# Patient Record
Sex: MALE | Race: WHITE | NOT HISPANIC OR LATINO | URBAN - METROPOLITAN AREA
[De-identification: names, ages, dates, MRNs, and addresses within clinical notes are randomized per-mention and may not be internally consistent; named-entity substitution may affect disease eponyms.]

---

## 2017-11-09 ENCOUNTER — OFFICE VISIT - RIVER FALLS (OUTPATIENT)
Dept: FAMILY MEDICINE | Facility: CLINIC | Age: 42
End: 2017-11-09

## 2017-11-09 ASSESSMENT — MIFFLIN-ST. JEOR: SCORE: 1573.46

## 2022-02-11 VITALS
SYSTOLIC BLOOD PRESSURE: 110 MMHG | OXYGEN SATURATION: 99 % | HEIGHT: 72 IN | HEART RATE: 61 BPM | WEIGHT: 143.4 LBS | BODY MASS INDEX: 19.42 KG/M2 | DIASTOLIC BLOOD PRESSURE: 74 MMHG | TEMPERATURE: 97.7 F

## 2022-02-16 NOTE — PROGRESS NOTES
Patient:   CARMEN JEFFRIES            MRN: [id number]            FIN: [id number]               Age:   41 years     Sex:  Male     :  1975   Associated Diagnoses:   Otitis externa   Author:   Percy Aguiar MD      Chief Complaint   2017 8:12 AM CST    pt here to discuss ears, would like ear drops refilled, says he has holes in his ears, is unable to do the surgery due to insurance purposes, has loss of hearing      History of Present Illness   see chief complaint as noted above and confirmed with the patient     41 year old male here today to discuss his ears. He would like some drop's that he recieved from the [facility] a few years ago. He say's he has been told he has holes in his ear's and when he blows he the air comes through his ears. He say's he get's ear infection's sometimes and the drops help relieve his pain. He states there could be a surgery done but he is unable to pay for it at this time. He uses the drop's a few times a year, he say's the bottle last's him about a year and a half. This time of the year is tough due to the cold air and getting cold's and such. He also mention's hearing loss that has occured over the years.       Review of Systems   Constitutional:  No fever.    Ear/Nose/Mouth/Throat:       Ear pain: Bilateral.    Respiratory:  No cough.    Gastrointestinal:  No nausea, No vomiting, No diarrhea.    Integumentary:  No rash.    Neurologic:  Alert and oriented X4.             Health Status   Allergies:    Allergic Reactions (Selected)  Anaphylaxis  Penicillin (No reactions were documented)  Sulfa drug (No reactions were documented)   Medications:  (Selected)   Documented Medications  Documented  Tylenol: po, Instructions: as needed, 0 Refill(s), Type: Maintenance   Problem list:    No problem items selected or recorded.      Histories   Past Medical History:    No active or resolved past medical history items have been selected or recorded.   Family History:    Sofie  disease  Mother     Procedure history:    No active procedure history items have been selected or recorded.   Social History:        Alcohol Assessment            Current, 1-2 times per week, 1 drinks/episode average.  2 drinks/episode maximum.      Tobacco Assessment            Never smoker      Substance Abuse Assessment            Past, Marijuana      Employment and Education Assessment            Employed, Work/School description: Retail.      Nutrition and Health Assessment            Type of diet: Regular.      Exercise and Physical Activity Assessment            Exercise frequency: 1-2 times/week.  Exercise type: Back Stretches.      Sexual Assessment            Sexually active: Yes.  Sexual orientation: Heterosexual.  Contraceptive Use Details: Condoms.        Physical Examination   Vital Signs   11/9/2017 8:12 AM CST Temperature Tympanic 97.7 DegF  LOW    Peripheral Pulse Rate 61 bpm    Pulse Site Radial artery    Systolic Blood Pressure 110 mmHg    Diastolic Blood Pressure 74 mmHg    Mean Arterial Pressure 86 mmHg    BP Site Right arm    Oxygen Saturation 99 %      Measurements from flowsheet : Measurements   11/9/2017 8:12 AM CST Height Measured - Standard 72 in    Weight Measured - Standard 143.4 lb    BSA 1.82 m2    Body Mass Index 19.45 kg/m2      General:  Alert and oriented, No acute distress.    Eye:  Pupils are equal, round and reactive to light, Normal conjunctiva.    HENT:  Oral mucosa is moist.    Neck:  Supple, No lymphadenopathy.    Respiratory:  Respirations are non-labored.    Musculoskeletal:  Normal gait.    Integumentary:  Warm, No rash.    Psychiatric:  Cooperative, Appropriate mood & affect, Normal judgment.       Review / Management   Results review      Impression and Plan       Diagnosis     Otitis externa ([id number]-HM H60.90).     Course:  i did not see TM perforation or hole  some ear canal reddness.    Plan:  Will send in ear drops with a few refills. Please come back in for a visit  if you find yourself using the drops more often. .    I, Snow Dumont Medical Assistant acted solely as a scribe for, and in presence of Dr. Percy Aguiar who performed the services.

## 2025-04-15 ENCOUNTER — HOSPITAL ENCOUNTER (EMERGENCY)
Facility: HOSPITAL | Age: 50
Discharge: HOME | End: 2025-04-15
Attending: EMERGENCY MEDICINE | Admitting: EMERGENCY MEDICINE
Payer: COMMERCIAL

## 2025-04-15 ENCOUNTER — APPOINTMENT (OUTPATIENT)
Dept: CT IMAGING | Facility: HOSPITAL | Age: 50
End: 2025-04-15
Payer: COMMERCIAL

## 2025-04-15 ENCOUNTER — APPOINTMENT (OUTPATIENT)
Dept: RADIOLOGY | Facility: HOSPITAL | Age: 50
End: 2025-04-15
Payer: COMMERCIAL

## 2025-04-15 VITALS
SYSTOLIC BLOOD PRESSURE: 131 MMHG | HEIGHT: 60 IN | WEIGHT: 155 LBS | TEMPERATURE: 98.7 F | DIASTOLIC BLOOD PRESSURE: 77 MMHG | HEART RATE: 58 BPM | OXYGEN SATURATION: 100 % | RESPIRATION RATE: 13 BRPM | BODY MASS INDEX: 30.43 KG/M2

## 2025-04-15 DIAGNOSIS — R55 SYNCOPE, UNSPECIFIED SYNCOPE TYPE: ICD-10-CM

## 2025-04-15 DIAGNOSIS — F32.A DEPRESSION, UNSPECIFIED DEPRESSION TYPE: ICD-10-CM

## 2025-04-15 PROBLEM — N20.0 KIDNEY STONE: Status: ACTIVE | Noted: 2025-04-15

## 2025-04-15 LAB
ALBUMIN SERPL BCG-MCNC: 4.9 G/DL (ref 3.5–5.2)
ALBUMIN UR-MCNC: NEGATIVE MG/DL
ALP SERPL-CCNC: 80 U/L (ref 40–150)
ALT SERPL W P-5'-P-CCNC: 15 U/L (ref 0–70)
ANION GAP SERPL CALCULATED.3IONS-SCNC: 11 MMOL/L (ref 7–15)
APPEARANCE UR: CLEAR
AST SERPL W P-5'-P-CCNC: 25 U/L (ref 0–45)
BASOPHILS # BLD AUTO: 0 10E3/UL (ref 0–0.2)
BASOPHILS NFR BLD AUTO: 0 %
BILIRUB SERPL-MCNC: 0.6 MG/DL
BILIRUB UR QL STRIP: NEGATIVE
BUN SERPL-MCNC: 18.3 MG/DL (ref 6–20)
CALCIUM SERPL-MCNC: 9.4 MG/DL (ref 8.8–10.4)
CHLORIDE SERPL-SCNC: 102 MMOL/L (ref 98–107)
COLOR UR AUTO: NORMAL
CREAT SERPL-MCNC: 0.92 MG/DL (ref 0.67–1.17)
D DIMER PPP FEU-MCNC: <0.27 UG/ML FEU (ref 0–0.5)
EGFRCR SERPLBLD CKD-EPI 2021: >90 ML/MIN/1.73M2
EOSINOPHIL # BLD AUTO: 0.1 10E3/UL (ref 0–0.7)
EOSINOPHIL NFR BLD AUTO: 1 %
ERYTHROCYTE [DISTWIDTH] IN BLOOD BY AUTOMATED COUNT: 13.2 % (ref 10–15)
GLUCOSE SERPL-MCNC: 124 MG/DL (ref 70–99)
GLUCOSE UR STRIP-MCNC: NEGATIVE MG/DL
HCO3 SERPL-SCNC: 29 MMOL/L (ref 22–29)
HCT VFR BLD AUTO: 47.8 % (ref 40–53)
HGB BLD-MCNC: 15.8 G/DL (ref 13.3–17.7)
HGB UR QL STRIP: NEGATIVE
IMM GRANULOCYTES # BLD: 0 10E3/UL
IMM GRANULOCYTES NFR BLD: 0 %
KETONES UR STRIP-MCNC: NEGATIVE MG/DL
LEUKOCYTE ESTERASE UR QL STRIP: NEGATIVE
LIPASE SERPL-CCNC: 161 U/L (ref 13–60)
LYMPHOCYTES # BLD AUTO: 1.3 10E3/UL (ref 0.8–5.3)
LYMPHOCYTES NFR BLD AUTO: 11 %
MAGNESIUM SERPL-MCNC: 1.8 MG/DL (ref 1.7–2.3)
MCH RBC QN AUTO: 28.9 PG (ref 26.5–33)
MCHC RBC AUTO-ENTMCNC: 33.1 G/DL (ref 31.5–36.5)
MCV RBC AUTO: 87 FL (ref 78–100)
MONOCYTES # BLD AUTO: 0.6 10E3/UL (ref 0–1.3)
MONOCYTES NFR BLD AUTO: 5 %
NEUTROPHILS # BLD AUTO: 10 10E3/UL (ref 1.6–8.3)
NEUTROPHILS NFR BLD AUTO: 83 %
NITRATE UR QL: NEGATIVE
NRBC # BLD AUTO: 0 10E3/UL
NRBC BLD AUTO-RTO: 0 /100
PH UR STRIP: 7 [PH] (ref 5–7)
PLATELET # BLD AUTO: 221 10E3/UL (ref 150–450)
POTASSIUM SERPL-SCNC: 4 MMOL/L (ref 3.4–5.3)
PROT SERPL-MCNC: 7.5 G/DL (ref 6.4–8.3)
RBC # BLD AUTO: 5.47 10E6/UL (ref 4.4–5.9)
RBC URINE: 0 /HPF
SODIUM SERPL-SCNC: 142 MMOL/L (ref 135–145)
SP GR UR STRIP: 1.01 (ref 1–1.03)
TROPONIN T SERPL HS-MCNC: <6 NG/L
UROBILINOGEN UR STRIP-MCNC: NORMAL MG/DL
WBC # BLD AUTO: 12 10E3/UL (ref 4–11)
WBC URINE: <1 /HPF

## 2025-04-15 PROCEDURE — 83735 ASSAY OF MAGNESIUM: CPT

## 2025-04-15 PROCEDURE — 83690 ASSAY OF LIPASE: CPT

## 2025-04-15 PROCEDURE — 85041 AUTOMATED RBC COUNT: CPT

## 2025-04-15 PROCEDURE — 85379 FIBRIN DEGRADATION QUANT: CPT

## 2025-04-15 PROCEDURE — 93005 ELECTROCARDIOGRAM TRACING: CPT | Performed by: EMERGENCY MEDICINE

## 2025-04-15 PROCEDURE — 84484 ASSAY OF TROPONIN QUANT: CPT

## 2025-04-15 PROCEDURE — 71046 X-RAY EXAM CHEST 2 VIEWS: CPT

## 2025-04-15 PROCEDURE — 85004 AUTOMATED DIFF WBC COUNT: CPT

## 2025-04-15 PROCEDURE — 80053 COMPREHEN METABOLIC PANEL: CPT

## 2025-04-15 PROCEDURE — 93005 ELECTROCARDIOGRAM TRACING: CPT | Performed by: STUDENT IN AN ORGANIZED HEALTH CARE EDUCATION/TRAINING PROGRAM

## 2025-04-15 PROCEDURE — 36415 COLL VENOUS BLD VENIPUNCTURE: CPT

## 2025-04-15 PROCEDURE — 70450 CT HEAD/BRAIN W/O DYE: CPT

## 2025-04-15 PROCEDURE — 99285 EMERGENCY DEPT VISIT HI MDM: CPT | Mod: 25 | Performed by: EMERGENCY MEDICINE

## 2025-04-15 PROCEDURE — 81001 URINALYSIS AUTO W/SCOPE: CPT

## 2025-04-15 ASSESSMENT — COLUMBIA-SUICIDE SEVERITY RATING SCALE - C-SSRS
6. HAVE YOU EVER DONE ANYTHING, STARTED TO DO ANYTHING, OR PREPARED TO DO ANYTHING TO END YOUR LIFE?: NO
2. HAVE YOU ACTUALLY HAD ANY THOUGHTS OF KILLING YOURSELF IN THE PAST MONTH?: NO
1. IN THE PAST MONTH, HAVE YOU WISHED YOU WERE DEAD OR WISHED YOU COULD GO TO SLEEP AND NOT WAKE UP?: NO

## 2025-04-15 ASSESSMENT — ACTIVITIES OF DAILY LIVING (ADL)
ADLS_ACUITY_SCORE: 41
ADLS_ACUITY_SCORE: 41

## 2025-04-15 NOTE — Clinical Note
Yogesh Holloway was seen and treated in our emergency department on 4/15/2025.  He may return to work on 04/16/2025.       If you have any questions or concerns, please don't hesitate to call.      Cori Tenorio PA-C
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
## 2025-04-15 NOTE — DISCHARGE INSTRUCTIONS
You are evaluated for syncope here today.  All of your lab work and imaging was reassuring here today.  Placed a primary care referral for you, you should follow-up with them to discuss if any further work up is indicated or any new concerns you may have.     If you develop chest pain, or any new or worsening symptoms, please return to the Emergency Department for evaluation.
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
## 2025-04-16 LAB
ATRIAL RATE - MUSE: 73 BPM
DIASTOLIC BLOOD PRESSURE - MUSE: 101 MMHG
INTERPRETATION ECG - MUSE: NORMAL
P AXIS - MUSE: 63 DEGREES
PR INTERVAL - MUSE: 142 MS
QRS DURATION - MUSE: 74 MS
QT - MUSE: 400 MS
QTC - MUSE: 440 MS
R AXIS - MUSE: 61 DEGREES
SYSTOLIC BLOOD PRESSURE - MUSE: 162 MMHG
T AXIS - MUSE: 64 DEGREES
VENTRICULAR RATE- MUSE: 73 BPM

## 2025-04-18 PROBLEM — F12.10 MARIJUANA ABUSE: Status: ACTIVE | Noted: 2025-04-18

## 2025-04-18 PROBLEM — R55 SYNCOPE: Status: ACTIVE | Noted: 2025-04-18

## 2025-04-18 PROBLEM — F41.0 PANIC ATTACK: Status: ACTIVE | Noted: 2025-04-18

## 2025-04-18 PROBLEM — N20.0 KIDNEY STONE: Status: RESOLVED | Noted: 2025-04-15 | Resolved: 2025-04-18

## 2025-04-18 PROBLEM — F41.9 ANXIETY: Status: ACTIVE | Noted: 2025-04-17

## 2025-04-18 PROBLEM — R10.32 LEFT GROIN PAIN: Status: ACTIVE | Noted: 2025-04-18

## 2025-04-23 ENCOUNTER — PATIENT OUTREACH (OUTPATIENT)
Dept: CARE COORDINATION | Facility: CLINIC | Age: 50
End: 2025-04-23
Payer: COMMERCIAL

## 2025-04-25 ENCOUNTER — OFFICE VISIT (OUTPATIENT)
Dept: SURGERY | Facility: CLINIC | Age: 50
End: 2025-04-25
Payer: COMMERCIAL

## 2025-04-25 ENCOUNTER — HOSPITAL ENCOUNTER (OUTPATIENT)
Dept: ULTRASOUND IMAGING | Facility: HOSPITAL | Age: 50
Discharge: HOME | End: 2025-04-25
Attending: SURGERY | Admitting: SURGERY
Payer: COMMERCIAL

## 2025-04-25 VITALS — BODY MASS INDEX: 20.9 KG/M2 | HEIGHT: 72 IN | WEIGHT: 154.3 LBS

## 2025-04-25 DIAGNOSIS — R10.32 LEFT GROIN PAIN: ICD-10-CM

## 2025-04-25 PROCEDURE — 76705 ECHO EXAM OF ABDOMEN: CPT

## 2025-04-25 PROCEDURE — 99243 OFF/OP CNSLTJ NEW/EST LOW 30: CPT | Performed by: SURGERY

## 2025-04-25 NOTE — LETTER
4/25/2025      Yogesh Holloway  [address] [place] [address] [address] [place] [id number]      Dear Colleague,    Thank you for referring your patient, Yogesh Holloway, to the [facility] [place]. Please see a copy of my visit note below.    HPI:  Yogesh Holloway is a 49 year old male who was referred to me by Marlo Weston for an inguinal hernia. He  presents today with complaints of left groin pain that has been plaguing him now for several years.  He underwent an open left inguinal hernia repair in 2019, and notes that he has been struggling with left groin pain basically ever since that time.  He recalls feeling a pop 2 weeks following that surgery after which he began having more severe pain.    2 years ago, he did undergo evaluation for this with another surgeon.  Part of that workup included CT imaging, as well as what sounds like a nerve injection in the area of the left groin.  He notes that this left the area numb, but did not significantly improve the pain in his left groin.  He recalls being told after that workup that he would simply have to deal with the pain as there was nothing else that could be done.    He continues to struggle with groin pain, which has prompted his referral to us today.      Allergies:Sulfamethoxazole-trimethoprim, Penicillins, and Sulfa antibiotics    No past medical history on file.    Past Surgical History:   Procedure Laterality Date     INGUINAL HERNIA REPAIR Left 2019     MYRINGOTOMY W/ TUBES Bilateral      WISDOM TOOTH EXTRACTION         CURRENT MEDS:  No current outpatient medications on file.       Family History   Problem Relation Age of Onset     Graves' disease Mother      Cervical Cancer Mother      Hypertension Father      Myocardial Infarction Father      Heart Surgery Father         reports that he has never smoked. He has never used smokeless tobacco. He reports current drug use. Frequency: 7.00 times per week. Drug:  Marijuana.    Review of Systems -   The 10 point review of systems  is within normal limits except for as mentioned above in the HPI.  General ROS: No complaints or constitutional symptoms  Skin: No complaints or symptoms   Hematologic/Lymphatic: No symptoms or complaints  Psychiatric: No symptoms or complaints  Endocrine: No excessive fatigue, no hypermetabolic symptoms reported  Respiratory ROS: no cough, shortness of breath, or wheezing  Cardiovascular ROS: no chest pain or dyspnea on exertion  Gastrointestinal ROS: As per HPI  Musculoskeletal ROS: no recent injuries reported  Neurological ROS: no focal neurologic defects reported.        There were no vitals taken for this visit.  There is no height or weight on file to calculate BMI.    EXAM:  General : Alert, cooperative, appears stated age   Skin: Skin color, texture, turgor normal, no rashes or lesions   Lymphatic: No obvious adenopathy, no swelling   Eyes: No scleral icterus, pupils equal  HENT: no traumatic injury to the head or face, no gross abnormalities  Lungs: Normal respiratory effort, breath sounds equal bilaterally  Heart: Regular rate and rhythm  Abdomen: Soft, nondistended and currently nontender.  Has some focal tenderness in the left groin region superior and lateral to where I would expect the internal inguinal ring to reside.  No palpable hernia defect with or without Valsalva  Musculoskeletal: No obvious swelling,  Neurologic: Grossly intact        IMAGES:   Ultrasound imaging obtained at the end of our visit demonstrated no evidence of left sided inguinal hernia recurrence with or without Valsalva.        Assessment/Plan:   1. Left groin pain        Yogesh Holloway is a 49 year old male with left groin pain, but no clear evidence of hernia recurrence.  At this point our neck step would be to repeat an ilioinguinal nerve injection to see if there is any improvement in symptoms.    Adan Moran MD, FACS  Office: [phone]  [facility]  [place]   General and Bariatric Surgery      Again, thank you for allowing me to participate in the care of your patient.        Sincerely,        Adan Moran MD    Electronically signed

## 2025-04-25 NOTE — PROGRESS NOTES
HPI:  Yogesh Holloway is a 49 year old male who was referred to me by Marlo Weston for an inguinal hernia. He  presents today with complaints of left groin pain that has been plaguing him now for several years.  He underwent an open left inguinal hernia repair in 2019, and notes that he has been struggling with left groin pain basically ever since that time.  He recalls feeling a pop 2 weeks following that surgery after which he began having more severe pain.    2 years ago, he did undergo evaluation for this with another surgeon.  Part of that workup included CT imaging, as well as what sounds like a nerve injection in the area of the left groin.  He notes that this left the area numb, but did not significantly improve the pain in his left groin.  He recalls being told after that workup that he would simply have to deal with the pain as there was nothing else that could be done.    He continues to struggle with groin pain, which has prompted his referral to us today.      Allergies:Sulfamethoxazole-trimethoprim, Penicillins, and Sulfa antibiotics    No past medical history on file.    Past Surgical History:   Procedure Laterality Date    INGUINAL HERNIA REPAIR Left 2019    MYRINGOTOMY W/ TUBES Bilateral     WISDOM TOOTH EXTRACTION         CURRENT MEDS:  No current outpatient medications on file.       Family History   Problem Relation Age of Onset    Graves' disease Mother     Cervical Cancer Mother     Hypertension Father     Myocardial Infarction Father     Heart Surgery Father         reports that he has never smoked. He has never used smokeless tobacco. He reports current drug use. Frequency: 7.00 times per week. Drug: Marijuana.    Review of Systems -   The 10 point review of systems  is within normal limits except for as mentioned above in the HPI.  General ROS: No complaints or constitutional symptoms  Skin: No complaints or symptoms   Hematologic/Lymphatic: No symptoms or complaints  Psychiatric: No  symptoms or complaints  Endocrine: No excessive fatigue, no hypermetabolic symptoms reported  Respiratory ROS: no cough, shortness of breath, or wheezing  Cardiovascular ROS: no chest pain or dyspnea on exertion  Gastrointestinal ROS: As per HPI  Musculoskeletal ROS: no recent injuries reported  Neurological ROS: no focal neurologic defects reported.        There were no vitals taken for this visit.  There is no height or weight on file to calculate BMI.    EXAM:  General : Alert, cooperative, appears stated age   Skin: Skin color, texture, turgor normal, no rashes or lesions   Lymphatic: No obvious adenopathy, no swelling   Eyes: No scleral icterus, pupils equal  HENT: no traumatic injury to the head or face, no gross abnormalities  Lungs: Normal respiratory effort, breath sounds equal bilaterally  Heart: Regular rate and rhythm  Abdomen: Soft, nondistended and currently nontender.  Has some focal tenderness in the left groin region superior and lateral to where I would expect the internal inguinal ring to reside.  No palpable hernia defect with or without Valsalva  Musculoskeletal: No obvious swelling,  Neurologic: Grossly intact        IMAGES:   Ultrasound imaging obtained at the end of our visit demonstrated no evidence of left sided inguinal hernia recurrence with or without Valsalva.        Assessment/Plan:   1. Left groin pain        Yogesh Holloway is a 49 year old male with left groin pain, but no clear evidence of hernia recurrence.  At this point our neck step would be to repeat an ilioinguinal nerve injection to see if there is any improvement in symptoms.    Adan Moran MD, FACS  Office: [phone]  [facility]   General and Bariatric Surgery

## 2025-04-27 ENCOUNTER — HEALTH MAINTENANCE LETTER (OUTPATIENT)
Age: 50
End: 2025-04-27

## 2025-05-06 ENCOUNTER — MEDICAL CORRESPONDENCE (OUTPATIENT)
Dept: HEALTH INFORMATION MANAGEMENT | Facility: CLINIC | Age: 50
End: 2025-05-06

## 2025-05-06 ENCOUNTER — OFFICE VISIT (OUTPATIENT)
Dept: CARDIOLOGY | Facility: CLINIC | Age: 50
End: 2025-05-06
Attending: INTERNAL MEDICINE
Payer: COMMERCIAL

## 2025-05-06 VITALS
DIASTOLIC BLOOD PRESSURE: 66 MMHG | HEIGHT: 72 IN | WEIGHT: 154.3 LBS | BODY MASS INDEX: 20.9 KG/M2 | SYSTOLIC BLOOD PRESSURE: 106 MMHG | HEART RATE: 67 BPM | OXYGEN SATURATION: 97 % | RESPIRATION RATE: 16 BRPM

## 2025-05-06 DIAGNOSIS — R06.09 DOE (DYSPNEA ON EXERTION): Primary | ICD-10-CM

## 2025-05-06 DIAGNOSIS — Z72.820 POOR SLEEP: ICD-10-CM

## 2025-05-06 DIAGNOSIS — R53.82 CHRONIC FATIGUE: ICD-10-CM

## 2025-05-06 DIAGNOSIS — R55 SYNCOPE, UNSPECIFIED SYNCOPE TYPE: ICD-10-CM

## 2025-05-06 PROCEDURE — 3078F DIAST BP <80 MM HG: CPT | Performed by: INTERNAL MEDICINE

## 2025-05-06 PROCEDURE — G2211 COMPLEX E/M VISIT ADD ON: HCPCS | Performed by: INTERNAL MEDICINE

## 2025-05-06 PROCEDURE — 99204 OFFICE O/P NEW MOD 45 MIN: CPT | Performed by: INTERNAL MEDICINE

## 2025-05-06 PROCEDURE — 3074F SYST BP LT 130 MM HG: CPT | Performed by: INTERNAL MEDICINE

## 2025-05-06 NOTE — PROGRESS NOTES
[facility] HEART CARE   [address]  [place] [address]   [email or web address]   OFFICE: [phone]     CARDIOLOGY CLINIC NOTE     Thank you, Marlo Mc, for asking the [facility] Heart Care team to see Mr. Yogesh Holloway to evaluate New Patient         Assessment/Recommendations   Assessment:    Syncope -  Had a recognizable prodrome with time to pull over. Labs reassuring, negative troponin. Normal ECG. Was also feeling acutely anxious shortly before symptoms started. Seems most likely related to a vasovagal response. Would wait until he has recurrent symptoms before arranging for rhythm monitoring devices.  Poor sleep, chronic fatigue - does not feel rested in the morning. Watch mentioned he may have sleep apnea.  Dyspnea on exertion - with moderate  or greater exertion.     Plan:  Echocardiogram for cardiac function  Treadmill Stress test for [facility].  Advised healthy diet and regular exercise  Follow up as needed with recurrent symptoms or abnormal test results.    The longitudinal plan of care for the diagnosis(es)/condition(s) as documented were addressed during this visit. Due to the added complexity in care, I will continue to support Yogesh in the subsequent management and with ongoing continuity of care.         History of Present Illness   Mr. Yogesh Holloway is a 49 year old male  who presents for evaluation of syncope.    Mr. Holloway was driving to work in the early morning (~0500), felt anxious, flushed, and nauseated. Pulled over, reclined his seat and remembers coming to after an unknown period of time. Went to [facility] ER and evaluation was largely unremarkable. He has not had subsequent symptoms. He did mention that he had an acute exacerbation of a neuropathic wrist injury just before his symptoms developed.  This exacerbation triggered highly anxious feelings and thoughts regarding his ability to continue to work and other consequences that  may result if he loses the function of his wrist.      Other than noted above, Mr. Holloway denies any chest pain/pressure/tightness, shortness of breath at rest or with exertion, light headedness/dizziness, pre-syncope, syncope, lower extremity swelling, palpitations, paroxysmal nocturnal dyspnea (PND), or orthopnea.     Cardiac Problems and Cardiac Diagnostics     Most Recent Cardiac testing:  ECG dated 4/15/25 (personaly reviewed and interpreted): normal sinus rhythm.         Medications  Allergies   No current outpatient medications on file.      Allergies   Allergen Reactions    Sulfamethoxazole-Trimethoprim Anaphylaxis     PN: LW Reaction: ANAPHYLAXIS    Penicillins     Sulfa Antibiotics         Physical Examination Review of Systems   Vitals: /66 (BP Location: Right arm, Patient Position: Sitting, Cuff Size: Adult Regular)   Pulse 67   Resp 16   Ht 1.829 m (6')   Wt 70 kg (154 lb 4.8 oz)   SpO2 97%   BMI 20.93 kg/m    BMI= Body mass index is 20.93 kg/m .  Wt Readings from Last 3 Encounters:   05/06/25 70 kg (154 lb 4.8 oz)   04/25/25 70 kg (154 lb 4.8 oz)   04/18/25 70.8 kg (156 lb)       General: pleasant male. No acute distress.   Neck: No JVD  Lungs: clear to auscultation  COR: normal rate, regular rhythm, No murmurs, rubs, or gallops  Extrem: No edema        Please refer above for cardiac ROS details.       Past History     Past Medical History  History reviewed. No pertinent past medical history.    Past Surgical History  Past Surgical History:   Procedure Laterality Date    INGUINAL HERNIA REPAIR Left 2019    MYRINGOTOMY W/ TUBES Bilateral     WISDOM TOOTH EXTRACTION         Family History:   Family History   Problem Relation Age of Onset    Graves' disease Mother     Cervical Cancer Mother     Hypertension Father     Myocardial Infarction Father     Heart Surgery Father         Social History:   Social History     Socioeconomic History    Marital status: Single     Spouse name: Not on file     Number of children: Not on file    Years of education: Not on file    Highest education level: Not on file   Occupational History    Occupation:    Tobacco Use    Smoking status: Never    Smokeless tobacco: Never   Vaping Use    Vaping status: Never Used   Substance and Sexual Activity    Alcohol use: Not on file     Comment: 1    Drug use: Yes     Frequency: 7.0 times per week     Types: Marijuana    Sexual activity: Not on file   Other Topics Concern    Not on file   Social History Narrative    Not on file     Social Drivers of Health     Financial Resource Strain: Low Risk  (4/17/2025)    Financial Resource Strain     Within the past 12 months, have you or your family members you live with been unable to get utilities (heat, electricity) when it was really needed?: No   Food Insecurity: Low Risk  (4/17/2025)    Food Insecurity     Within the past 12 months, did you worry that your food would run out before you got money to buy more?: No     Within the past 12 months, did the food you bought just not last and you didn t have money to get more?: No   Transportation Needs: Low Risk  (4/17/2025)    Transportation Needs     Within the past 12 months, has lack of transportation kept you from medical appointments, getting your medicines, non-medical meetings or appointments, work, or from getting things that you need?: No   Physical Activity: Not on file   Stress: Not on file   Social Connections: Not on file   Interpersonal Safety: Not on file   Housing Stability: High Risk (4/17/2025)    Housing Stability     Do you have housing? : Yes     Are you worried about losing your housing?: Yes            Lab Results    Chemistry/lipid CBC Cardiac Enzymes/BNP/TSH/INR   Lab Results   Component Value Date    BUN 18.3 04/15/2025     04/15/2025    CO2 29 04/15/2025    Lab Results   Component Value Date    WBC 12.0 (H) 04/15/2025    HGB 15.8 04/15/2025    HCT 47.8 04/15/2025    MCV 87 04/15/2025      "04/15/2025    No results found for: \"CKTOTAL\", \"CKMB\", \"TROPONINI\", \"BNP\", \"TSH\", \"INR\"         "

## 2025-05-06 NOTE — LETTER
5/6/2025    Marlo Weston MD  [address] [address]    RE: Yogesh Holloway       Dear Colleague,     I had the pleasure of seeing Yogesh Holloway in the [facility].    [facility] HEART CARE   [address]  [place] [address]   [email or web address]   OFFICE: [phone]     CARDIOLOGY CLINIC NOTE     Thank you, Marlo Mc, for asking the [facility] Heart Care team to see Mr. Yogesh Holloway to evaluate New Patient         Assessment/Recommendations   Assessment:    Syncope -  Had a recognizable prodrome with time to pull over. Labs reassuring, negative troponin. Normal ECG. Was also feeling acutely anxious shortly before symptoms started. Seems most likely related to a vasovagal response. Would wait until he has recurrent symptoms before arranging for rhythm monitoring devices.  Poor sleep, chronic fatigue - does not feel rested in the morning. Watch mentioned he may have sleep apnea.  Dyspnea on exertion - with moderate  or greater exertion.     Plan:  Echocardiogram for cardiac function  Treadmill Stress test for [facility].  Advised healthy diet and regular exercise  Follow up as needed with recurrent symptoms or abnormal test results.    The longitudinal plan of care for the diagnosis(es)/condition(s) as documented were addressed during this visit. Due to the added complexity in care, I will continue to support Yogesh in the subsequent management and with ongoing continuity of care.         History of Present Illness   Mr. Yogesh Holloway is a 49 year old male  who presents for evaluation of syncope.    Mr. Holloway was driving to work in the early morning (~0500), felt anxious, flushed, and nauseated. Pulled over, reclined his seat and remembers coming to after an unknown period of time. Went to [facility] ER and evaluation was largely unremarkable. He has not had subsequent symptoms. He did mention that he had an acute exacerbation of a  neuropathic wrist injury just before his symptoms developed.  This exacerbation triggered highly anxious feelings and thoughts regarding his ability to continue to work and other consequences that may result if he loses the function of his wrist.      Other than noted above, Mr. Holloway denies any chest pain/pressure/tightness, shortness of breath at rest or with exertion, light headedness/dizziness, pre-syncope, syncope, lower extremity swelling, palpitations, paroxysmal nocturnal dyspnea (PND), or orthopnea.     Cardiac Problems and Cardiac Diagnostics     Most Recent Cardiac testing:  ECG dated 4/15/25 (personaly reviewed and interpreted): normal sinus rhythm.         Medications  Allergies   No current outpatient medications on file.      Allergies   Allergen Reactions     Sulfamethoxazole-Trimethoprim Anaphylaxis     PN: LW Reaction: ANAPHYLAXIS     Penicillins      Sulfa Antibiotics         Physical Examination Review of Systems   Vitals: /66 (BP Location: Right arm, Patient Position: Sitting, Cuff Size: Adult Regular)   Pulse 67   Resp 16   Ht 1.829 m (6')   Wt 70 kg (154 lb 4.8 oz)   SpO2 97%   BMI 20.93 kg/m    BMI= Body mass index is 20.93 kg/m .  Wt Readings from Last 3 Encounters:   05/06/25 70 kg (154 lb 4.8 oz)   04/25/25 70 kg (154 lb 4.8 oz)   04/18/25 70.8 kg (156 lb)       General: pleasant male. No acute distress.   Neck: No JVD  Lungs: clear to auscultation  COR: normal rate, regular rhythm, No murmurs, rubs, or gallops  Extrem: No edema        Please refer above for cardiac ROS details.       Past History     Past Medical History  History reviewed. No pertinent past medical history.    Past Surgical History  Past Surgical History:   Procedure Laterality Date     INGUINAL HERNIA REPAIR Left 2019     MYRINGOTOMY W/ TUBES Bilateral      WISDOM TOOTH EXTRACTION         Family History:   Family History   Problem Relation Age of Onset     Graves' disease Mother      Cervical Cancer Mother       Hypertension Father      Myocardial Infarction Father      Heart Surgery Father         Social History:   Social History     Socioeconomic History     Marital status: Single     Spouse name: Not on file     Number of children: Not on file     Years of education: Not on file     Highest education level: Not on file   Occupational History     Occupation:    Tobacco Use     Smoking status: Never     Smokeless tobacco: Never   Vaping Use     Vaping status: Never Used   Substance and Sexual Activity     Alcohol use: Not on file     Comment: 1     Drug use: Yes     Frequency: 7.0 times per week     Types: Marijuana     Sexual activity: Not on file   Other Topics Concern     Not on file   Social History Narrative     Not on file     Social Drivers of Health     Financial Resource Strain: Low Risk  (4/17/2025)    Financial Resource Strain      Within the past 12 months, have you or your family members you live with been unable to get utilities (heat, electricity) when it was really needed?: No   Food Insecurity: Low Risk  (4/17/2025)    Food Insecurity      Within the past 12 months, did you worry that your food would run out before you got money to buy more?: No      Within the past 12 months, did the food you bought just not last and you didn t have money to get more?: No   Transportation Needs: Low Risk  (4/17/2025)    Transportation Needs      Within the past 12 months, has lack of transportation kept you from medical appointments, getting your medicines, non-medical meetings or appointments, work, or from getting things that you need?: No   Physical Activity: Not on file   Stress: Not on file   Social Connections: Not on file   Interpersonal Safety: Not on file   Housing Stability: High Risk (4/17/2025)    Housing Stability      Do you have housing? : Yes      Are you worried about losing your housing?: Yes            Lab Results    Chemistry/lipid CBC Cardiac Enzymes/BNP/TSH/INR   Lab Results  "  Component Value Date    BUN 18.3 04/15/2025     04/15/2025    CO2 29 04/15/2025    Lab Results   Component Value Date    WBC 12.0 (H) 04/15/2025    HGB 15.8 04/15/2025    HCT 47.8 04/15/2025    MCV 87 04/15/2025     04/15/2025    No results found for: \"CKTOTAL\", \"CKMB\", \"TROPONINI\", \"BNP\", \"TSH\", \"INR\"             Thank you for allowing me to participate in the care of your patient.      Sincerely,     Ej Day MD     [facility] Heart Care  cc:   Marlo Weston MD  [address] [place] [id number]      "

## 2025-05-06 NOTE — PATIENT INSTRUCTIONS
It was a pleasure to meet with you today.      Below is a summary of your visit.   I will place a referral for a sleep study through [facility]  Schedule an echocardiogram and a stress test to evaluate your heart function and tolerance to activity.  I encourage adequate nutrition with plenty of protein intake.  Also recommend regular exercise including weight training to build strength.  This tends to help with preventing drops in blood pressure especially with changes in position and vasovagal responses.  Follow up pending recurrent symptoms or abnormal test results.    We will call you to inform you of your test or procedure results within 3 business days of the test being performed.  If you do not hear from our office with the test results within 1 week please do not hesitate to call asking for these results.     Please do not hesitate to call the [facility] with any questions or concerns at [phone]. You can also reach my nurse, Antonietta, at [phone].    Sincerely,

## 2025-05-09 ENCOUNTER — RESULTS FOLLOW-UP (OUTPATIENT)
Dept: SURGERY | Facility: CLINIC | Age: 50
End: 2025-05-09